# Patient Record
Sex: FEMALE | Employment: FULL TIME | URBAN - METROPOLITAN AREA
[De-identification: names, ages, dates, MRNs, and addresses within clinical notes are randomized per-mention and may not be internally consistent; named-entity substitution may affect disease eponyms.]

---

## 2019-03-29 ENCOUNTER — HOSPITAL ENCOUNTER (EMERGENCY)
Facility: CLINIC | Age: 49
Discharge: HOME | End: 2019-03-29
Attending: EMERGENCY MEDICINE | Admitting: EMERGENCY MEDICINE
Payer: COMMERCIAL

## 2019-03-29 VITALS
WEIGHT: 130 LBS | HEIGHT: 57 IN | BODY MASS INDEX: 28.05 KG/M2 | TEMPERATURE: 100.1 F | OXYGEN SATURATION: 99 % | RESPIRATION RATE: 18 BRPM | HEART RATE: 95 BPM | SYSTOLIC BLOOD PRESSURE: 127 MMHG | DIASTOLIC BLOOD PRESSURE: 83 MMHG

## 2019-03-29 DIAGNOSIS — J03.90 TONSILLITIS: ICD-10-CM

## 2019-03-29 PROCEDURE — 94640 AIRWAY INHALATION TREATMENT: CPT

## 2019-03-29 PROCEDURE — 99285 EMERGENCY DEPT VISIT HI MDM: CPT | Mod: 25

## 2019-03-29 PROCEDURE — 25000128 H RX IP 250 OP 636: Performed by: EMERGENCY MEDICINE

## 2019-03-29 PROCEDURE — 25000125 ZZHC RX 250: Performed by: EMERGENCY MEDICINE

## 2019-03-29 PROCEDURE — 96361 HYDRATE IV INFUSION ADD-ON: CPT

## 2019-03-29 PROCEDURE — 42999 UNLISTED PX PHRNX ADND/TNSL: CPT

## 2019-03-29 PROCEDURE — 96374 THER/PROPH/DIAG INJ IV PUSH: CPT

## 2019-03-29 PROCEDURE — 76942 ECHO GUIDE FOR BIOPSY: CPT

## 2019-03-29 PROCEDURE — 25000132 ZZH RX MED GY IP 250 OP 250 PS 637: Performed by: EMERGENCY MEDICINE

## 2019-03-29 RX ORDER — PENICILLIN V POTASSIUM 500 MG/1
TABLET, FILM COATED ORAL 2 TIMES DAILY
COMMUNITY

## 2019-03-29 RX ORDER — CLINDAMYCIN HCL 300 MG
300 CAPSULE ORAL 4 TIMES DAILY
Qty: 28 CAPSULE | Refills: 0 | Status: SHIPPED | OUTPATIENT
Start: 2019-03-29 | End: 2019-04-05

## 2019-03-29 RX ORDER — ACETAMINOPHEN 500 MG
1000 TABLET ORAL ONCE
Status: COMPLETED | OUTPATIENT
Start: 2019-03-29 | End: 2019-03-29

## 2019-03-29 RX ORDER — DEXAMETHASONE SODIUM PHOSPHATE 4 MG/ML
10 INJECTION, SOLUTION INTRA-ARTICULAR; INTRALESIONAL; INTRAMUSCULAR; INTRAVENOUS; SOFT TISSUE ONCE
Status: COMPLETED | OUTPATIENT
Start: 2019-03-29 | End: 2019-03-29

## 2019-03-29 RX ADMIN — DEXAMETHASONE SODIUM PHOSPHATE 10 MG: 4 INJECTION, SOLUTION INTRA-ARTICULAR; INTRALESIONAL; INTRAMUSCULAR; INTRAVENOUS; SOFT TISSUE at 16:56

## 2019-03-29 RX ADMIN — SODIUM CHLORIDE 1000 ML: 9 INJECTION, SOLUTION INTRAVENOUS at 17:05

## 2019-03-29 RX ADMIN — LIDOCAINE HYDROCHLORIDE 3 ML: 40 INJECTION, SOLUTION RETROBULBAR; TOPICAL at 16:59

## 2019-03-29 RX ADMIN — ACETAMINOPHEN 1000 MG: 500 TABLET, FILM COATED ORAL at 16:54

## 2019-03-29 SDOH — HEALTH STABILITY: MENTAL HEALTH: HOW OFTEN DO YOU HAVE A DRINK CONTAINING ALCOHOL?: NEVER

## 2019-03-29 ASSESSMENT — ENCOUNTER SYMPTOMS
SORE THROAT: 1
FATIGUE: 1
VOICE CHANGE: 1
CHILLS: 1
TROUBLE SWALLOWING: 1

## 2019-03-29 ASSESSMENT — MIFFLIN-ST. JEOR: SCORE: 1083.56

## 2019-03-29 NOTE — LETTER
March 29, 2019      To Whom It May Concern:      Demi Woods was seen in our Emergency Department today, 03/29/19.  She may return to work 3/31/19    Sincerely,        Angela Delgadillo RN

## 2019-03-29 NOTE — ED AVS SNAPSHOT
Emergency Department  [address] [place] [address]  Phone:  [phone]  Fax:  [phone]                                    Demi Woods   MRN: [id number]    Department:   Emergency Department   Date of Visit:  3/29/2019           After Visit Summary Signature Page    I have received my discharge instructions, and my questions have been answered. I have discussed any challenges I see with this plan with the nurse or doctor.    ..........................................................................................................................................  Patient/Patient Representative Signature      ..........................................................................................................................................  Patient Representative Print Name and Relationship to Patient    ..................................................               ................................................  Date                                   Time    ..........................................................................................................................................  Reviewed by Signature/Title    ...................................................              ..............................................  Date                                               Time          22EPIC Rev 08/18

## 2019-03-29 NOTE — ED PROVIDER NOTES
"  History     Chief Complaint:  Oral swelling    HPI   Demi Roberson is a 48 year old female who presents as a referral from her primary care provider for possible peritonsillar abscess.  Patient was diagnosed with strep throat, and started on amoxicillin, but stated her pain is worse, and she went to the primary care doctor today, was found to have swollen tonsils as well as possibly swollen anterior tonsillar pillar.  Sent to the ER for further evaluation.  Patient states that she feels like she has a muffled voice, has some trouble sleeping because of her tonsils, and still has low-grade fevers.        Allergies:  No known allergies     Medications:    Penicillin     Past Medical History:    HTN    Past Surgical History:    The patient does not have any pertinent past surgical history.    Family History:    No past pertinent family history.    Social History:  Patient presents with   Negative for tobacco use.  Negative for alcohol use.  Marital Status:       Review of Systems   Constitutional: Positive for chills and fatigue.   HENT: Positive for sore throat, trouble swallowing and voice change.    All other systems reviewed and are negative.      Physical Exam   First Vitals:  BP: (!) 152/94  Pulse: 125  Temp: 100.1  F (37.8  C)  Resp: 18  Height: 144.8 cm (4' 9\")  Weight: 59 kg (130 lb)  SpO2: 96 %      Physical Exam  Vitals: reviewed by me  General: Pt seen on [facility] [place], cooperative, and alert to conversation  Eyes: Tracking well, clear conjunctiva BL  ENT: MMM, midline trachea.  Midline uvula.  2+ swollen tonsils with exudates noted.  Scant area of swelling in the left anterior tonsillar pillar at the superior aspect, this is asymmetric.  Lungs:   No tachypnea, no accessory muscle use. No respiratory distress.   CV: Rate as above, regular rhythm.    Abd: Soft, non tender, no guarding, no rebound. Non distended  MSK: no peripheral edema or joint effusion.  No evidence of " trauma  Skin: No rash, normal turgor and temperature  Neuro: Clear speech and no facial droop.  Psych: Not RIS, no e/o AH/VH      Emergency Department Course   Interventions:  1654 - NS 1L IV  1654 - Tylenol 1000 mg PO  1656 - Decadron 10 mg IV  1659 - lidocaine nebulizer solution 3 mL nebulization    Procedure  PROCEDURE NOTE:    PROCEDURE:  Ultrasound guided Incision and drainage of peritonsillar abscess  INDICATION:  peritonsillar abscess  PHYSICIAN:  Osmar Mitchell MD  DESCRIPTION OF PROCEDURE:    Informed consent detailing risks and benefits. Site was correctly identified. Anesthesia was obtained with cetacaine spray followed by local infiltration of 2cc of 1%lidocaine with epinephrine.  Using ultrasound guidance a 16g needle was advanced to the abscess cavity followed by aspiration of scant blood, less than 1 cc.  Repeat ultrasound demonstrated no residual abscess. Plan is outpatient antibiotics, pain control and close follow up with ENT.      Emergency Department Course:  Nursing notes and vitals reviewed.  1631: I performed an exam of the patient as documented above.     1745: I rechecked the patient. Findings and plan explained to the Patient. Patient discharged home with instructions regarding supportive care, medications, and reasons to return. The importance of close follow-up was reviewed.   Impression & Plan    Medical Decision Making:  Demi Woosd is a 48 year old female who presents to the emergency room with what appears to be tonsillitis. I understand that she was sent here for a peritonsilar abscess, and there was a very small area that was very asymmetric and red in the superior aspect of the tonsillarpillar. Though it is very small, and when I put a needle over the center of the fluctuance, I was only able to aspirate a scant amount of blood. Therefore, I do not think she has a peritonsilar abscess, she has a midline uvula, and her tonsils are grossly abnormal. In fact,  they were 2-3+ bilaterally with a symmetric swelling profile, and have both receded with Decadron. I will change the patient s antibiotics to cover true tonsillitis causes and give clindamycin for the next 7 days. Patient is ok with this plan and was monitored for an considerable amount of time after her procedure and has no bleeding at this time. All of her questions were answered, will discharge as above.     Diagnosis:    ICD-10-CM    1. Tonsillitis J03.90        Disposition:  discharged to home    Discharge Medications:     Medication List      Started    clindamycin 300 MG capsule  Commonly known as:  CLEOCIN  300 mg, Oral, 4 TIMES DAILY          IYo, am serving as a scribe on 3/29/2019 at 4:31 PM to personally document services performed by Osmar Mitchell MD based on my observations and the provider's statements to me.       Yo Gant  3/29/2019    EMERGENCY DEPARTMENT       Osmar Mitchell MD  03/29/19 [id number]

## 2019-03-31 ENCOUNTER — HOSPITAL ENCOUNTER (EMERGENCY)
Facility: CLINIC | Age: 49
Discharge: HOME | End: 2019-03-31
Attending: EMERGENCY MEDICINE | Admitting: EMERGENCY MEDICINE
Payer: COMMERCIAL

## 2019-03-31 VITALS
DIASTOLIC BLOOD PRESSURE: 97 MMHG | TEMPERATURE: 97.4 F | HEIGHT: 57 IN | HEART RATE: 109 BPM | RESPIRATION RATE: 18 BRPM | BODY MASS INDEX: 28.13 KG/M2 | SYSTOLIC BLOOD PRESSURE: 162 MMHG | OXYGEN SATURATION: 97 %

## 2019-03-31 DIAGNOSIS — J02.0 PHARYNGITIS DUE TO STREPTOCOCCUS SPECIES: ICD-10-CM

## 2019-03-31 PROCEDURE — 42999 UNLISTED PX PHRNX ADND/TNSL: CPT

## 2019-03-31 PROCEDURE — 99283 EMERGENCY DEPT VISIT LOW MDM: CPT | Mod: 25

## 2019-03-31 RX ORDER — HYDROCODONE BITARTRATE AND ACETAMINOPHEN 5; 325 MG/1; MG/1
1-2 TABLET ORAL EVERY 6 HOURS PRN
Qty: 18 TABLET | Refills: 0 | Status: SHIPPED | OUTPATIENT
Start: 2019-03-31 | End: 2019-04-03

## 2019-03-31 ASSESSMENT — ENCOUNTER SYMPTOMS
SORE THROAT: 1
FACIAL SWELLING: 1
TROUBLE SWALLOWING: 1

## 2019-03-31 NOTE — ED AVS SNAPSHOT
Emergency Department  [address] [place] [address]  Phone:  [phone]  Fax:  [phone]                                    Demi Woods   MRN: [id number]    Department:   Emergency Department   Date of Visit:  3/31/2019           After Visit Summary Signature Page    I have received my discharge instructions, and my questions have been answered. I have discussed any challenges I see with this plan with the nurse or doctor.    ..........................................................................................................................................  Patient/Patient Representative Signature      ..........................................................................................................................................  Patient Representative Print Name and Relationship to Patient    ..................................................               ................................................  Date                                   Time    ..........................................................................................................................................  Reviewed by Signature/Title    ...................................................              ..............................................  Date                                               Time          22EPIC Rev 08/18

## 2019-03-31 NOTE — ED PROVIDER NOTES
"  History     Chief Complaint:  Pharyngitis    The history is provided by the patient and a relative. The history is limited by a language barrier. No  was used (translated by daughter).      Demi Woods is a 48 year old female with hypertension who was recently seen a number of times. 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 She suddenly began experiencing throat pain with fever earlier Tuesday morning (3/26/19). She went to [facility] on Tuesday where she was diagnosed with strep throat and given Penicillin which she took from Tuesday-Thursday to much relief. The patient states she still could not sleep because of the throat pain. She then went back to [facility] on Friday (3/29/19) where they suspected swollen tonsils and a peritonsillar abscess so they referred her here. Here, the patient underwent a incision and drainage procedure but only a scant amount of blood was aspirated at that time. The physician noted that the tonsils were grossly abnormal at that time and they were \"2-3+ bilaterally with a symmetric swelling profile, and have both receded with 10 mg Decadron.\" The patient was also switched from penicillin to clindamycin and was prescribed ibuprofen. The patient states these medications helped relieve the pain and open up her throat but it is still painful to swallow today. The patient has not been taking her temperatures. The patient also notes that her tonsillar swelling is about equivalent today as it was two days ago.     Allergies:  No known drug allergies    Medications:    Cleocin  Veetid    Past Medical History:    Hypertension    Past Surgical History:    History reviewed. No pertinent surgical history.    Family History:    History reviewed. No pertinent family history.     Social History:  The patient is not a smoker and never drinks alcohol.   Marital Status:   [2]     Review of Systems   HENT: Positive " "for facial swelling (tonsils), sore throat and trouble swallowing.    All other systems reviewed and are negative.    Physical Exam     Patient Vitals for the past 24 hrs:   BP Temp Temp src Pulse Resp SpO2 Height   03/31/19 1118 (!) 162/97 97.4  F (36.3  C) Temporal 109 18 97 % 1.448 m (4' 9\")     Physical Exam  SKIN:  No rash.  HEMATOLOGIC/IMMUNOLOGIC/LYMPHATIC:  No cervical adenopathy.  HENT:  Moist oral mucosa.  No tonsil exudate, diffuse posterior pharyngeal erythema, left peritonsillar swelling with associated palpable tenderness of this area.  Normal voice quality.  No sublingual or submandibular edema or mass.  No trismus.  Dentition in good condition.  CARDIOVASCULAR:  Tachycardic rate and regular rhythm.  RESPIRATORY:  No stridor.  Non-labored breathing.  Normal breath sounds.  MUSCULOSKELETAL:  ROM of the head and neck does not exacerbate sore throat.  NEUROLOGIC:  Alert, conversant.  PSYCHIATRIC:  Normal mood.    Emergency Department Course   Procedures:  PROCEDURE:  Incision and drainage of peritonsillar abscess  INDICATION:  Possible left peritonsillar abscess  PHYSICIAN:  Musa Solorzano MD  DESCRIPTION OF PROCEDURE:    Informed consent detailing risks and benefits. Site was correctly identified. Anesthesia was obtained with local infiltration of 2cc of 1%lidocaine with epinephrine.  A spinal needle with 1 cm of the needle guard cut aways was advanced to the suspected abscess cavity followed by no aspiration of purulent material.     Plan is continued antibiotics, pain control and follow up with ENT.    Emergency Department Course:  Past medical records, nursing notes, and vitals reviewed.  1128: I performed an exam of the patient and obtained history, as documented above.   1152: I performed an I & D procedure as per procedure note above.    1224: I spoke with Dr. Willson regarding this patient.  1247: I rechecked the patient. Findings and plan explained to the Patient. Patient discharged " home with instructions regarding supportive care, medications, and reasons to return. The importance of close follow-up was reviewed.     Impression & Plan    Medical Decision Making:  This is a 48 year old female who presents with ongoing pain to the posterior pharynx favoring the left side where it is swollen. Recently diagnosed with streptococcal phayngitis and she did 3 days of treatment with penicillin which was not helping so was referred here two days ago with concern of peritonsillar abscess. My colleague evaluated her and attempted aspiration but it doesn't sound like there was pus evacuated. Her examination does seem consistent with a potential abscess so I attempted the above procedure as well and also did not have pus return by aspiration. She tolerated this well. I think clindamycin is an appropriate antibiotic for this and I did prescribe norco as she felt over-the-counter medications were marginal for pain relief. I spoke with Dr. Willson with ENT who can see her in clinic tomorrow or the following day to re-assess.     Diagnosis:    ICD-10-CM    1. Pharyngitis due to Streptococcus species J02.0        Disposition:  discharged to home    Discharge Medications:     Medication List      Started    HYDROcodone-acetaminophen 5-325 MG tablet  Commonly known as:  NORCO  1-2 tablets, Oral, EVERY 6 HOURS PRN            Kavon Begum  3/31/2019    EMERGENCY DEPARTMENT  Kavon HWANG, am serving as a scribe at 11:28 AM on 3/31/2019 to document services personally performed by Musa Solorzano MD based on my observations and the provider's statements to me.       Musa Solorzano MD  04/02/19 1024

## 2019-03-31 NOTE — LETTER
March 31, 2019      To Whom It May Concern:      Demi Woods was seen in our Emergency Department today, 03/31/19.  I expect her condition to improve over the next 3 days.  She may return to work/school when improved.    Sincerely,        Sharee Marroquin RN

## 2019-04-19 ENCOUNTER — HEALTH MAINTENANCE LETTER (OUTPATIENT)
Age: 49
End: 2019-04-19

## 2019-11-08 ENCOUNTER — HEALTH MAINTENANCE LETTER (OUTPATIENT)
Age: 49
End: 2019-11-08

## 2020-02-23 ENCOUNTER — HEALTH MAINTENANCE LETTER (OUTPATIENT)
Age: 50
End: 2020-02-23

## 2020-12-06 ENCOUNTER — HEALTH MAINTENANCE LETTER (OUTPATIENT)
Age: 50
End: 2020-12-06

## 2021-02-20 ENCOUNTER — HEALTH MAINTENANCE LETTER (OUTPATIENT)
Age: 51
End: 2021-02-20

## 2021-09-26 ENCOUNTER — HEALTH MAINTENANCE LETTER (OUTPATIENT)
Age: 51
End: 2021-09-26

## 2022-01-15 ENCOUNTER — HEALTH MAINTENANCE LETTER (OUTPATIENT)
Age: 52
End: 2022-01-15

## 2022-03-12 ENCOUNTER — HEALTH MAINTENANCE LETTER (OUTPATIENT)
Age: 52
End: 2022-03-12

## 2023-04-22 ENCOUNTER — HEALTH MAINTENANCE LETTER (OUTPATIENT)
Age: 53
End: 2023-04-22

## 2023-06-06 ENCOUNTER — LAB REQUISITION (OUTPATIENT)
Dept: LAB | Facility: CLINIC | Age: 53
End: 2023-06-06

## 2023-06-06 PROCEDURE — 88342 IMHCHEM/IMCYTCHM 1ST ANTB: CPT | Mod: TC | Performed by: PATHOLOGY

## 2023-06-06 PROCEDURE — 88342 IMHCHEM/IMCYTCHM 1ST ANTB: CPT | Mod: TC
